# Patient Record
Sex: MALE | Race: WHITE | NOT HISPANIC OR LATINO | Employment: UNEMPLOYED | URBAN - NONMETROPOLITAN AREA
[De-identification: names, ages, dates, MRNs, and addresses within clinical notes are randomized per-mention and may not be internally consistent; named-entity substitution may affect disease eponyms.]

---

## 2022-05-25 ENCOUNTER — OFFICE VISIT (OUTPATIENT)
Dept: FAMILY MEDICINE CLINIC | Age: 22
End: 2022-05-25

## 2022-05-25 VITALS
SYSTOLIC BLOOD PRESSURE: 110 MMHG | DIASTOLIC BLOOD PRESSURE: 74 MMHG | HEART RATE: 112 BPM | BODY MASS INDEX: 36.95 KG/M2 | HEIGHT: 64 IN | WEIGHT: 216.4 LBS

## 2022-05-25 DIAGNOSIS — F84.0 AUTISM: ICD-10-CM

## 2022-05-25 DIAGNOSIS — J30.89 OTHER ALLERGIC RHINITIS: Primary | ICD-10-CM

## 2022-05-25 PROCEDURE — 99203 OFFICE O/P NEW LOW 30 MIN: CPT | Performed by: FAMILY MEDICINE

## 2022-05-25 RX ORDER — CETIRIZINE HYDROCHLORIDE 10 MG/1
10 TABLET ORAL DAILY
Qty: 90 TABLET | Refills: 3 | Status: SHIPPED | OUTPATIENT
Start: 2022-05-25

## 2022-05-25 RX ORDER — CETIRIZINE HYDROCHLORIDE 10 MG/1
10 TABLET ORAL DAILY
COMMUNITY
End: 2022-05-25 | Stop reason: SDUPTHER

## 2022-05-25 NOTE — PROGRESS NOTES
"Chief Complaint  Establish Care and Allergies (Med refill)    Subjective          Aldo Joel presents to [facility]  NEW PATIENT:  --HE HAS AUTISM, DIAGNOSIS CONFIRMED BY [facility].  IS TAKING AN OTC SUPPLEMENT FOR \"ANXIETY\"WHICH IS WORKING FAIRLY WELL  BUT HAS NEVER BEEN ON A PRESCRIPTION MED FOR THIS ISSUE  --IS ON [id number] AND OCCASIONAL IBUPROFEN FOR ALLERGIC RHINITIS, NEEDS REFILLS         No Known Allergies     Health Maintenance Due   Topic Date Due   • ANNUAL PHYSICAL  Never done   • HEPATITIS C SCREENING  Never done        Current Outpatient Medications on File Prior to Visit   Medication Sig   • [DISCONTINUED] cetirizine (zyrTEC) 10 MG tablet Take 10 mg by mouth Daily.   • [DISCONTINUED] ibuprofen (ADVIL,MOTRIN) 100 MG/5ML suspension Take  by mouth Every 6 (Six) Hours As Needed for Mild Pain .     No current facility-administered medications on file prior to visit.       Immunization History   Administered Date(s) Administered   • DTaP 2001, 2001, 2001   • Hep A, 2 Dose 02/24/2018, 09/01/2018   • Hep B, Adolescent or Pediatric 2001, 2001, 02/04/2002   • Hib (PRP-T) 2001, 2001, 2001, 11/04/2002   • Hpv9 07/09/2018, 09/15/2018, 01/19/2019   • IPV 2001, 2001, 11/04/2002, 06/27/2005   • Influenza TIV (IM) 11/25/2003, 12/30/2003   • MMR 08/07/2002, 06/27/2005   • Meningococcal B,(Bexsero) 01/19/2019   • Meningococcal Conjugate 07/22/2013, 07/03/2017   • Meningococcal, Unspecified 07/11/2019   • Pneumococcal Conjugate 13-Valent (PCV13) 2001, 2001, 02/04/2002, 11/04/2002   • Tdap 07/16/2012, 07/29/2021   • Varicella 05/06/2002, 07/18/2011       Review of Systems   Constitutional: Negative for activity change, appetite change, chills, fatigue and fever.   HENT: Negative for congestion, ear pain, hearing loss, rhinorrhea and sore throat.    Eyes: Negative for blurred vision and discharge. " "  Respiratory: Negative for cough and shortness of breath.    Cardiovascular: Negative for chest pain, palpitations and leg swelling.   Gastrointestinal: Negative for abdominal pain, constipation, diarrhea, nausea and vomiting.   Genitourinary: Negative for dysuria and hematuria.   Musculoskeletal: Negative for arthralgias and myalgias.   Neurological: Negative for headache.   Psychiatric/Behavioral: Negative for depressed mood.        Objective     /74 (BP Location: Left arm, Patient Position: Sitting)   Pulse 112   Ht 162.6 cm (64\")   Wt 98.2 kg (216 lb 6.4 oz)   BMI 37.14 kg/m²       Physical Exam  Vitals and nursing note reviewed.   Constitutional:       General: He is not in acute distress.     Appearance: Normal appearance.   Cardiovascular:      Rate and Rhythm: Normal rate and regular rhythm.      Heart sounds: Normal heart sounds. No murmur heard.  Pulmonary:      Effort: Pulmonary effort is normal.      Breath sounds: Normal breath sounds.   Abdominal:      Palpations: Abdomen is soft.      Tenderness: There is no abdominal tenderness.   Musculoskeletal:      Cervical back: Neck supple.      Right lower leg: No edema.      Left lower leg: No edema.   Lymphadenopathy:      Cervical: No cervical adenopathy.   Neurological:      General: No focal deficit present.      Mental Status: He is alert.      Cranial Nerves: No cranial nerve deficit.      Coordination: Coordination normal.      Gait: Gait normal.   Psychiatric:         Mood and Affect: Mood normal.         Behavior: Behavior normal.         Result Review :                             Assessment and Plan      Diagnoses and all orders for this visit:    1. Other allergic rhinitis (Primary)  Assessment & Plan:  IMPROVED WITH CURRENT TREATMENT, CONTINUE SAME, WILL REEVALUATE AT NEXT VISIT       2. Autism  Assessment & Plan:  Psychological condition is unchanged.  Continue current treatment regimen.  Psychological condition  will be reassessed in " 3 months   [facility].  MAY CONTINUE THE OTC SUPPLEMENT.  CONSIDER PSYCHIATRY EVAL (DECLINES AT THIS TIME)  .      Other orders  -     ibuprofen (ADVIL,MOTRIN) 100 MG/5ML suspension; Take 20 mL by mouth Every 6 (Six) Hours As Needed for Mild Pain  (FEVER).  Dispense: 240 mL; Refill: 3  -     cetirizine (zyrTEC) 10 MG tablet; Take 1 tablet by mouth Daily.  Dispense: 90 tablet; Refill: 3          Follow Up     Return in about 6 months (around 11/25/2022) for Medicare Wellness.    Patient was given instructions and counseling regarding his condition or for health maintenance advice. Please see specific information pulled into the AVS if appropriate.

## 2022-05-25 NOTE — ASSESSMENT & PLAN NOTE
Psychological condition is unchanged.  Continue current treatment regimen.  Psychological condition  will be reassessed in 3 months   NOTES FROM [facility] REVIEWED.  MAY CONTINUE THE OTC SUPPLEMENT.  CONSIDER PSYCHIATRY EVAL (DECLINES AT THIS TIME)  .

## 2022-08-01 ENCOUNTER — OFFICE VISIT (OUTPATIENT)
Dept: FAMILY MEDICINE CLINIC | Age: 22
End: 2022-08-01

## 2022-08-01 VITALS
SYSTOLIC BLOOD PRESSURE: 109 MMHG | HEIGHT: 64 IN | DIASTOLIC BLOOD PRESSURE: 65 MMHG | BODY MASS INDEX: 36.6 KG/M2 | WEIGHT: 214.4 LBS | HEART RATE: 98 BPM

## 2022-08-01 DIAGNOSIS — Z00.00 ANNUAL PHYSICAL EXAM: Primary | ICD-10-CM

## 2022-08-01 PROCEDURE — 3008F BODY MASS INDEX DOCD: CPT | Performed by: FAMILY MEDICINE

## 2022-08-01 PROCEDURE — 2014F MENTAL STATUS ASSESS: CPT | Performed by: FAMILY MEDICINE

## 2022-08-01 PROCEDURE — 99395 PREV VISIT EST AGE 18-39: CPT | Performed by: FAMILY MEDICINE

## 2022-08-01 NOTE — PROGRESS NOTES
Chief Complaint  Annual Exam    Subjective          Aldo Joel presents to [facility]  --ANNUAL EXAM, LAST EXAM > ONE YEAR AGO, DOES NOT SMOKE, NO CONCERNS  --AUTISM AND ALLERGIES ARE STABLE         No Known Allergies     Health Maintenance Due   Topic Date Due   • HEPATITIS C SCREENING  Never done        Current Outpatient Medications on File Prior to Visit   Medication Sig   • cetirizine (zyrTEC) 10 MG tablet Take 1 tablet by mouth Daily.   • ibuprofen (ADVIL,MOTRIN) 100 MG/5ML suspension Take 20 mL by mouth Every 6 (Six) Hours As Needed for Mild Pain  (FEVER).     No current facility-administered medications on file prior to visit.       Immunization History   Administered Date(s) Administered   • DTaP 2001, 2001, 2001, 11/04/2002, 06/27/2005   • Hep A, 2 Dose 02/24/2018, 09/01/2018   • Hep B, Adolescent or Pediatric 2001, 2001, 02/04/2002, 02/24/2018, 09/01/2018   • HiB 2001, 2001, 2001, 11/04/2002   • Hib (PRP-T) 2001, 2001, 2001, 11/04/2002   • Hpv9 07/09/2018, 09/15/2018, 01/19/2019   • IPV 2001, 2001, 11/04/2002, 06/27/2005   • Influenza TIV (IM) 11/25/2003, 12/30/2003   • Influenza, Unspecified 11/25/2003, 12/30/2003   • MMR 08/07/2002, 06/27/2005   • Meningococcal B,(Bexsero) 01/19/2019, 07/11/2019   • Meningococcal Conjugate 07/22/2013, 07/03/2017   • Meningococcal MCV4P (Menactra) 07/22/2013, 07/03/2017   • Meningococcal, Unspecified 07/11/2019   • Pneumococcal Conjugate 13-Valent (PCV13) 2001, 2001, 02/04/2002, 11/04/2002   • Tdap 07/16/2012, 07/29/2021   • Varicella 05/06/2002, 07/18/2011       Review of Systems   Constitutional: Negative for activity change, appetite change, chills, fatigue and fever.   HENT: Negative for congestion, ear pain, hearing loss, rhinorrhea and sore throat.    Eyes: Negative for blurred vision and discharge.   Respiratory: Negative for cough  "and shortness of breath.    Cardiovascular: Negative for chest pain, palpitations and leg swelling.   Gastrointestinal: Negative for abdominal pain, constipation, diarrhea, nausea and vomiting.   Genitourinary: Negative for dysuria and hematuria.   Musculoskeletal: Negative for arthralgias and myalgias.   Neurological: Negative for headache.   Psychiatric/Behavioral: Negative for depressed mood.        Objective     /65 (BP Location: Left arm, Patient Position: Sitting)   Pulse 98   Ht 162.6 cm (64\")   Wt 97.3 kg (214 lb 6.4 oz)   BMI 36.80 kg/m²       Physical Exam  Vitals and nursing note reviewed.   Constitutional:       General: He is not in acute distress.     Appearance: Normal appearance.   HENT:      Right Ear: Tympanic membrane normal.      Left Ear: Tympanic membrane normal.      Mouth/Throat:      Pharynx: Oropharynx is clear.   Eyes:      Conjunctiva/sclera: Conjunctivae normal.   Cardiovascular:      Rate and Rhythm: Normal rate and regular rhythm.      Heart sounds: Normal heart sounds. No murmur heard.  Pulmonary:      Effort: Pulmonary effort is normal.      Breath sounds: Normal breath sounds.   Abdominal:      General: Bowel sounds are normal.      Palpations: Abdomen is soft.      Tenderness: There is no abdominal tenderness.   Genitourinary:     Testes: Normal.   Musculoskeletal:      Cervical back: Neck supple.      Right lower leg: No edema.      Left lower leg: No edema.   Lymphadenopathy:      Cervical: No cervical adenopathy.   Neurological:      General: No focal deficit present.      Mental Status: He is alert.      Cranial Nerves: No cranial nerve deficit.      Coordination: Coordination normal.      Gait: Gait normal.   Psychiatric:         Mood and Affect: Mood normal.         Behavior: Behavior normal.         Result Review :                             Assessment and Plan      Diagnoses and all orders for this visit:    1. Annual physical exam (Primary)  Assessment & " Plan:  ADVICE GIVEN RE:  SEATBELT USE, ALCOHOL USE, HEALTHY DIET, ROUTINE EYE AND DENTAL EXAM, ROUTINE VACCINATIONS, TESTICULAR SELF-EXAMINATION             Follow Up     Return in about 1 year (around 8/1/2023), or if symptoms worsen or fail to improve.    Patient was given instructions and counseling regarding his condition or for health maintenance advice. Please see specific information pulled into the AVS if appropriate.

## 2022-08-01 NOTE — ASSESSMENT & PLAN NOTE
ADVICE GIVEN RE:  SEATBELT USE, ALCOHOL USE, HEALTHY DIET, ROUTINE EYE AND DENTAL EXAM, ROUTINE VACCINATIONS, TESTICULAR SELF-EXAMINATION

## 2023-03-22 ENCOUNTER — TRANSCRIBE ORDERS (OUTPATIENT)
Dept: ADMINISTRATIVE | Facility: HOSPITAL | Age: 23
End: 2023-03-22
Payer: COMMERCIAL

## 2023-03-22 ENCOUNTER — LAB (OUTPATIENT)
Dept: LAB | Facility: HOSPITAL | Age: 23
End: 2023-03-22
Payer: COMMERCIAL

## 2023-03-22 DIAGNOSIS — Z79.899 ENCOUNTER FOR LONG-TERM (CURRENT) USE OF OTHER MEDICATIONS: ICD-10-CM

## 2023-03-22 DIAGNOSIS — Z79.899 ENCOUNTER FOR LONG-TERM (CURRENT) USE OF OTHER MEDICATIONS: Primary | ICD-10-CM

## 2023-03-22 LAB
ALBUMIN SERPL-MCNC: 4.6 G/DL (ref 3.5–5.2)
ALBUMIN/GLOB SERPL: 1.6 G/DL
ALP SERPL-CCNC: 82 U/L (ref 39–117)
ALT SERPL W P-5'-P-CCNC: 36 U/L (ref 1–41)
ANION GAP SERPL CALCULATED.3IONS-SCNC: 11 MMOL/L (ref 5–15)
AST SERPL-CCNC: 22 U/L (ref 1–40)
BASOPHILS # BLD AUTO: 0.05 10*3/MM3 (ref 0–0.2)
BASOPHILS NFR BLD AUTO: 0.6 % (ref 0–1.5)
BILIRUB SERPL-MCNC: 0.4 MG/DL (ref 0–1.2)
BUN SERPL-MCNC: 7 MG/DL (ref 6–20)
BUN/CREAT SERPL: 8.4 (ref 7–25)
CALCIUM SPEC-SCNC: 9.6 MG/DL (ref 8.6–10.5)
CHLORIDE SERPL-SCNC: 102 MMOL/L (ref 98–107)
CHOLEST SERPL-MCNC: 201 MG/DL (ref 0–200)
CO2 SERPL-SCNC: 23 MMOL/L (ref 22–29)
CREAT SERPL-MCNC: 0.83 MG/DL (ref 0.76–1.27)
DEPRECATED RDW RBC AUTO: 37.9 FL (ref 37–54)
EGFRCR SERPLBLD CKD-EPI 2021: 127.7 ML/MIN/1.73
EOSINOPHIL # BLD AUTO: 0.13 10*3/MM3 (ref 0–0.4)
EOSINOPHIL NFR BLD AUTO: 1.6 % (ref 0.3–6.2)
ERYTHROCYTE [DISTWIDTH] IN BLOOD BY AUTOMATED COUNT: 12.3 % (ref 12.3–15.4)
GLOBULIN UR ELPH-MCNC: 2.8 GM/DL
GLUCOSE SERPL-MCNC: 91 MG/DL (ref 65–99)
HBA1C MFR BLD: 5.4 % (ref 4.8–5.6)
HCT VFR BLD AUTO: 46.5 % (ref 37.5–51)
HDLC SERPL-MCNC: 34 MG/DL (ref 40–60)
HGB BLD-MCNC: 15.9 G/DL (ref 13–17.7)
IMM GRANULOCYTES # BLD AUTO: 0.04 10*3/MM3 (ref 0–0.05)
IMM GRANULOCYTES NFR BLD AUTO: 0.5 % (ref 0–0.5)
LDLC SERPL CALC-MCNC: 133 MG/DL (ref 0–100)
LDLC/HDLC SERPL: 3.81 {RATIO}
LYMPHOCYTES # BLD AUTO: 2.5 10*3/MM3 (ref 0.7–3.1)
LYMPHOCYTES NFR BLD AUTO: 31.2 % (ref 19.6–45.3)
MCH RBC QN AUTO: 28.8 PG (ref 26.6–33)
MCHC RBC AUTO-ENTMCNC: 34.2 G/DL (ref 31.5–35.7)
MCV RBC AUTO: 84.2 FL (ref 79–97)
MONOCYTES # BLD AUTO: 0.67 10*3/MM3 (ref 0.1–0.9)
MONOCYTES NFR BLD AUTO: 8.4 % (ref 5–12)
NEUTROPHILS NFR BLD AUTO: 4.63 10*3/MM3 (ref 1.7–7)
NEUTROPHILS NFR BLD AUTO: 57.7 % (ref 42.7–76)
NRBC BLD AUTO-RTO: 0 /100 WBC (ref 0–0.2)
PLATELET # BLD AUTO: 315 10*3/MM3 (ref 140–450)
PMV BLD AUTO: 10.8 FL (ref 6–12)
POTASSIUM SERPL-SCNC: 4.2 MMOL/L (ref 3.5–5.2)
PROT SERPL-MCNC: 7.4 G/DL (ref 6–8.5)
RBC # BLD AUTO: 5.52 10*6/MM3 (ref 4.14–5.8)
SODIUM SERPL-SCNC: 136 MMOL/L (ref 136–145)
TRIGL SERPL-MCNC: 188 MG/DL (ref 0–150)
VLDLC SERPL-MCNC: 34 MG/DL (ref 5–40)
WBC NRBC COR # BLD: 8.02 10*3/MM3 (ref 3.4–10.8)

## 2023-03-22 PROCEDURE — 80061 LIPID PANEL: CPT

## 2023-03-22 PROCEDURE — 85025 COMPLETE CBC W/AUTO DIFF WBC: CPT

## 2023-03-22 PROCEDURE — 80053 COMPREHEN METABOLIC PANEL: CPT

## 2023-03-22 PROCEDURE — 83036 HEMOGLOBIN GLYCOSYLATED A1C: CPT

## 2023-03-22 PROCEDURE — 36415 COLL VENOUS BLD VENIPUNCTURE: CPT

## 2023-08-02 ENCOUNTER — OFFICE VISIT (OUTPATIENT)
Dept: FAMILY MEDICINE CLINIC | Age: 23
End: 2023-08-02
Payer: COMMERCIAL

## 2023-08-02 VITALS
HEIGHT: 64 IN | WEIGHT: 249 LBS | BODY MASS INDEX: 42.51 KG/M2 | DIASTOLIC BLOOD PRESSURE: 82 MMHG | SYSTOLIC BLOOD PRESSURE: 128 MMHG | HEART RATE: 101 BPM

## 2023-08-02 DIAGNOSIS — Z00.00 ANNUAL PHYSICAL EXAM: Primary | ICD-10-CM

## 2023-08-02 PROBLEM — E78.00 ELEVATED CHOLESTEROL: Status: ACTIVE | Noted: 2023-08-02

## 2023-08-02 PROBLEM — K21.9 GERD WITHOUT ESOPHAGITIS: Status: ACTIVE | Noted: 2023-08-02

## 2023-08-02 PROCEDURE — 1159F MED LIST DOCD IN RCRD: CPT | Performed by: FAMILY MEDICINE

## 2023-08-02 PROCEDURE — 1160F RVW MEDS BY RX/DR IN RCRD: CPT | Performed by: FAMILY MEDICINE

## 2023-08-02 PROCEDURE — 3008F BODY MASS INDEX DOCD: CPT | Performed by: FAMILY MEDICINE

## 2023-08-02 PROCEDURE — 99395 PREV VISIT EST AGE 18-39: CPT | Performed by: FAMILY MEDICINE

## 2023-08-02 PROCEDURE — 2014F MENTAL STATUS ASSESS: CPT | Performed by: FAMILY MEDICINE

## 2023-08-02 RX ORDER — ARIPIPRAZOLE 2 MG/1
2 TABLET ORAL DAILY
COMMUNITY
Start: 2023-07-13

## 2023-08-14 ENCOUNTER — OFFICE VISIT (OUTPATIENT)
Dept: FAMILY MEDICINE CLINIC | Age: 23
End: 2023-08-14
Payer: COMMERCIAL

## 2023-08-14 VITALS
WEIGHT: 246.8 LBS | HEART RATE: 87 BPM | SYSTOLIC BLOOD PRESSURE: 126 MMHG | HEIGHT: 64 IN | DIASTOLIC BLOOD PRESSURE: 76 MMHG | BODY MASS INDEX: 42.14 KG/M2

## 2023-08-14 DIAGNOSIS — N50.89 MASS OF LEFT TESTICLE: Primary | ICD-10-CM

## 2023-08-14 PROBLEM — Z00.00 ANNUAL PHYSICAL EXAM: Status: RESOLVED | Noted: 2022-08-01 | Resolved: 2023-08-14

## 2023-08-14 PROCEDURE — 1160F RVW MEDS BY RX/DR IN RCRD: CPT | Performed by: FAMILY MEDICINE

## 2023-08-14 PROCEDURE — 1159F MED LIST DOCD IN RCRD: CPT | Performed by: FAMILY MEDICINE

## 2023-08-14 PROCEDURE — 99213 OFFICE O/P EST LOW 20 MIN: CPT | Performed by: FAMILY MEDICINE

## 2023-08-14 NOTE — PROGRESS NOTES
Chief Complaint  Mass (Lump On testicle)    Subjective          Aldo Joel presents to [facility]  History of Present Illness  --THE PATIENT NOTED A SMALL PAINLESS MASS IN THE LEFT TESTICLE A FEW DAYS AGO, NO URINARY OR GASTROINTESTINAL SYMPTOMS.        No Known Allergies     Health Maintenance Due   Topic Date Due    COVID-19 Vaccine (1) Never done    HEPATITIS C SCREENING  Never done        Current Outpatient Medications on File Prior to Visit   Medication Sig    ARIPiprazole (ABILIFY) 2 MG tablet Take 1 tablet by mouth Daily.    cetirizine (zyrTEC) 10 MG tablet TAKE ONE TABLET BY MOUTH DAILY    ibuprofen (ADVIL,MOTRIN) 100 MG/5ML suspension Take 20 mL by mouth Every 6 (Six) Hours As Needed for Mild Pain  (FEVER).     No current facility-administered medications on file prior to visit.       Immunization History   Administered Date(s) Administered    DTaP 2001, 2001, 2001, 11/04/2002, 06/27/2005    Hep A, 2 Dose 02/24/2018, 09/01/2018    Hep B, Adolescent or Pediatric 2001, 2001, 02/04/2002, 02/24/2018, 09/01/2018    HiB 2001, 2001, 2001, 11/04/2002    Hib (PRP-T) 2001, 2001, 2001, 11/04/2002    Hpv9 07/09/2018, 09/15/2018, 01/19/2019    IPV 2001, 2001, 11/04/2002, 06/27/2005    Influenza TIV (IM) 11/25/2003, 12/30/2003    Influenza, Unspecified 11/25/2003, 12/30/2003    MMR 08/07/2002, 06/27/2005    Meningococcal B,(Bexsero) 01/19/2019, 07/11/2019    Meningococcal Conjugate 07/22/2013, 07/03/2017    Meningococcal MCV4P (Menactra) 07/22/2013, 07/03/2017    Meningococcal, Unspecified 07/11/2019    Pneumococcal Conjugate 13-Valent (PCV13) 2001, 2001, 02/04/2002, 11/04/2002    Tdap 07/16/2012, 07/29/2021    Varicella 05/06/2002, 07/18/2011       Review of Systems   Constitutional:  Negative for activity change, appetite change, chills, fatigue and fever.   HENT:  Negative for  "congestion, ear pain, rhinorrhea and sore throat.    Respiratory:  Negative for cough and shortness of breath.    Cardiovascular:  Negative for chest pain, palpitations and leg swelling.   Gastrointestinal:  Negative for abdominal pain, constipation, diarrhea, nausea and vomiting.   Musculoskeletal:  Negative for arthralgias and myalgias.   Neurological:  Negative for headache.      Objective     /76 (BP Location: Left arm, Patient Position: Sitting)   Pulse 87   Ht 162.6 cm (64\")   Wt 112 kg (246 lb 12.8 oz)   BMI 42.36 kg/mý       Physical Exam  Vitals and nursing note reviewed.   Constitutional:       General: He is not in acute distress.     Appearance: Normal appearance.   Pulmonary:      Effort: Pulmonary effort is normal.   Abdominal:      Palpations: Abdomen is soft.      Tenderness: There is no abdominal tenderness.   Genitourinary:     Penis: Normal.       Testes: Normal.      Comments: NO APPARENT MASS PALPATED TODAY   Musculoskeletal:      Right lower leg: No edema.      Left lower leg: No edema.   Neurological:      General: No focal deficit present.      Mental Status: He is alert.      Cranial Nerves: No cranial nerve deficit.      Coordination: Coordination normal.      Gait: Gait normal.   Psychiatric:         Mood and Affect: Mood normal.         Behavior: Behavior normal.       Result Review :                             Assessment and Plan      Diagnoses and all orders for this visit:    1. Mass of left testicle (Primary)  Assessment & Plan:  NO PALPABLE MAS FOUND TODAY BUT WILL CHECK AN U/S AS A PRECAUTION     Orders:  -     US Scrotum & Testicles; Future            Follow Up     Return if symptoms worsen or fail to improve.    Patient was given instructions and counseling regarding his condition or for health maintenance advice. Please see specific information pulled into the AVS if appropriate.           "

## 2023-08-28 ENCOUNTER — HOSPITAL ENCOUNTER (OUTPATIENT)
Dept: ULTRASOUND IMAGING | Facility: HOSPITAL | Age: 23
Discharge: HOME | End: 2023-08-28
Admitting: FAMILY MEDICINE
Payer: COMMERCIAL

## 2023-08-28 DIAGNOSIS — N50.89 MASS OF LEFT TESTICLE: ICD-10-CM

## 2023-08-28 PROCEDURE — 76870 US EXAM SCROTUM: CPT

## 2023-08-31 DIAGNOSIS — N44.2 CYST OF TESTIS: Primary | ICD-10-CM

## 2023-09-06 ENCOUNTER — OFFICE VISIT (OUTPATIENT)
Dept: UROLOGY | Facility: CLINIC | Age: 23
End: 2023-09-06
Payer: COMMERCIAL

## 2023-09-06 VITALS — WEIGHT: 247 LBS | BODY MASS INDEX: 42.17 KG/M2 | HEIGHT: 64 IN

## 2023-09-06 DIAGNOSIS — N50.3 EPIDIDYMAL CYST: Primary | ICD-10-CM

## 2023-09-06 PROCEDURE — 1160F RVW MEDS BY RX/DR IN RCRD: CPT | Performed by: NURSE PRACTITIONER

## 2023-09-06 PROCEDURE — 1159F MED LIST DOCD IN RCRD: CPT | Performed by: NURSE PRACTITIONER

## 2023-09-06 PROCEDURE — 99203 OFFICE O/P NEW LOW 30 MIN: CPT | Performed by: NURSE PRACTITIONER

## 2023-09-06 NOTE — PROGRESS NOTES
"Chief Complaint: cyst of testis    Subjective         History of Present Illness  Aldo Joel is a 23 y.o. male presents to [facility] to be seen for testicular cyst.\    Patient saw primary care provider on 8/14/2023 with complaints of small painless mass in the left testicle.  He was ordered a an ultrasound of the testicles and scrotum.    Testicular scrotal ultrasound performed on 8/28/2023 revealed small 7 mm cyst in the left epididymal tail.    He states he noticed this about a month ago.     It has not changed since he first noticed it.     He reports that he has no pain with.    He reports no family HX of  malignancies.     He is a non smoker.          Objective     Past Medical History:   Diagnosis Date    Depression     Seasonal allergies        Past Surgical History:   Procedure Laterality Date    WISDOM TOOTH EXTRACTION           Current Outpatient Medications:     ARIPiprazole (ABILIFY) 2 MG tablet, Take 1 tablet by mouth Daily., Disp: , Rfl:     cetirizine (zyrTEC) 10 MG tablet, TAKE ONE TABLET BY MOUTH DAILY, Disp: 90 tablet, Rfl: 1    ibuprofen (ADVIL,MOTRIN) 100 MG/5ML suspension, Take 20 mL by mouth Every 6 (Six) Hours As Needed for Mild Pain  (FEVER)., Disp: 240 mL, Rfl: 3    No Known Allergies     Family History   Problem Relation Age of Onset    No Known Problems Mother     No Known Problems Father        Social History     Socioeconomic History    Marital status: Single   Tobacco Use    Smoking status: Never     Passive exposure: Never    Smokeless tobacco: Never       Vital Signs:   Ht 162.6 cm (64\")   Wt 112 kg (247 lb)   BMI 42.40 kg/m²      Physical Exam     Result Review :   The following data was reviewed by: JENNA Feliz on 09/06/2023:  Results for orders placed or performed in visit on 03/22/23   Comprehensive Metabolic Panel    Specimen: Blood   Result Value Ref Range    Glucose 91 65 - 99 mg/dL    BUN 7 6 - 20 mg/dL    Creatinine 0.83 " 0.76 - 1.27 mg/dL    Sodium 136 136 - 145 mmol/L    Potassium 4.2 3.5 - 5.2 mmol/L    Chloride 102 98 - 107 mmol/L    CO2 23.0 22.0 - 29.0 mmol/L    Calcium 9.6 8.6 - 10.5 mg/dL    Total Protein 7.4 6.0 - 8.5 g/dL    Albumin 4.6 3.5 - 5.2 g/dL    ALT (SGPT) 36 1 - 41 U/L    AST (SGOT) 22 1 - 40 U/L    Alkaline Phosphatase 82 39 - 117 U/L    Total Bilirubin 0.4 0.0 - 1.2 mg/dL    Globulin 2.8 gm/dL    A/G Ratio 1.6 g/dL    BUN/Creatinine Ratio 8.4 7.0 - 25.0    Anion Gap 11.0 5.0 - 15.0 mmol/L    eGFR 127.7 >60.0 mL/min/1.73   Hemoglobin A1c    Specimen: Blood   Result Value Ref Range    Hemoglobin A1C 5.40 4.80 - 5.60 %   Lipid Panel    Specimen: Blood   Result Value Ref Range    Total Cholesterol 201 (H) 0 - 200 mg/dL    Triglycerides 188 (H) 0 - 150 mg/dL    HDL Cholesterol 34 (L) 40 - 60 mg/dL    LDL Cholesterol  133 (H) 0 - 100 mg/dL    VLDL Cholesterol 34 5 - 40 mg/dL    LDL/HDL Ratio 3.81    CBC Auto Differential    Specimen: Blood   Result Value Ref Range    WBC 8.02 3.40 - 10.80 10*3/mm3    RBC 5.52 4.14 - 5.80 10*6/mm3    Hemoglobin 15.9 13.0 - 17.7 g/dL    Hematocrit 46.5 37.5 - 51.0 %    MCV 84.2 79.0 - 97.0 fL    MCH 28.8 26.6 - 33.0 pg    MCHC 34.2 31.5 - 35.7 g/dL    RDW 12.3 12.3 - 15.4 %    RDW-SD 37.9 37.0 - 54.0 fl    MPV 10.8 6.0 - 12.0 fL    Platelets 315 140 - 450 10*3/mm3    Neutrophil % 57.7 42.7 - 76.0 %    Lymphocyte % 31.2 19.6 - 45.3 %    Monocyte % 8.4 5.0 - 12.0 %    Eosinophil % 1.6 0.3 - 6.2 %    Basophil % 0.6 0.0 - 1.5 %    Immature Grans % 0.5 0.0 - 0.5 %    Neutrophils, Absolute 4.63 1.70 - 7.00 10*3/mm3    Lymphocytes, Absolute 2.50 0.70 - 3.10 10*3/mm3    Monocytes, Absolute 0.67 0.10 - 0.90 10*3/mm3    Eosinophils, Absolute 0.13 0.00 - 0.40 10*3/mm3    Basophils, Absolute 0.05 0.00 - 0.20 10*3/mm3    Immature Grans, Absolute 0.04 0.00 - 0.05 10*3/mm3    nRBC 0.0 0.0 - 0.2 /100 WBC            Procedures        Assessment and Plan    Diagnoses and all orders for this visit:    1.  Epididymal cyst (Primary)  -     US Scrotum & Testicles; Future    Andrez with the patient at this point in time his epididymal cyst appears to be benign in nature.  Discussed with him that an epididymal cyst is a fluid-filled sac that develops within the epididymis where the sperm from the testes collect.  These occur for a number of reasons but are always benign and generally cause mild discomfort only and are not associated with cancer.    Given the size of this I do not suspect this to be the cause of any discomfort he is currently having.    We will follow-up in 6 months with a repeat ultrasound to document stability.  If cyst stays to stable we will only need as needed follow-up.      Discussed with the patient that unless this cyst were to grow to be half or same size as testicle and would be the known cause of any pain would be the only reason that we would excise this.          I spent 15 minutes caring for Aldo on this date of service. This time includes time spent by me in the following activities:reviewing tests, obtaining and/or reviewing a separately obtained history, performing a medically appropriate examination and/or evaluation , counseling and educating the patient/family/caregiver, ordering medications, tests, or procedures, and documenting information in the medical record  Follow Up   Return in about 6 months (around 3/6/2024) for f/u epididymal cyst .  Patient was given instructions and counseling regarding his condition or for health maintenance advice. Please see specific information pulled into the AVS if appropriate.         This document has been electronically signed by JENNA Feliz  September 6, 2023 09:59 EDT

## 2023-10-03 ENCOUNTER — TELEPHONE (OUTPATIENT)
Dept: FAMILY MEDICINE CLINIC | Age: 23
End: 2023-10-03
Payer: COMMERCIAL

## 2023-10-03 DIAGNOSIS — K21.9 GERD WITHOUT ESOPHAGITIS: Primary | ICD-10-CM

## 2023-10-03 NOTE — TELEPHONE ENCOUNTER
Pts mother Mariam called and said his acid reflux has gotten worse.  Spicy foods make him vomit, has a lot of heart burn and reflux.  He has been taking otc tums.  She said she talked to Dr Clarke last time when they were in and said Dr Clarke said to continue the tums for now if they are helping him.   She said he needs a liquid or chew anti acid, he can not swallow pills.   She also is wanting to make sure whatever is ordered is compatible with his aripiprazole that [facility] gives him.

## 2023-10-04 ENCOUNTER — DOCUMENTATION (OUTPATIENT)
Dept: FAMILY MEDICINE CLINIC | Age: 23
End: 2023-10-04
Payer: COMMERCIAL

## 2023-10-04 NOTE — TELEPHONE ENCOUNTER
Spoke to pharmacist at [facility]. There is no interaction between omeprazole and abilify. However, pharmacy does not have any omeprazole liquid and it would have to be compounded, which they do not do and insurance does not cover. Pharmacist states that omeprazole capsules can be opened and contents sprinkled on food, etc. And would be covered by insurance. Please advise

## 2023-10-04 NOTE — TELEPHONE ENCOUNTER
I have sent a prescription for liquid omeprazole to take twice a day. Please check with the pharmacist to make sure this won’t interfere with his other medications. In addition he can take a liquid antacid or chewable and acid as needed and reduce his intake of spicy foods and caffeine. Check back with him in a week or two to see how he’s feeling.

## 2023-10-06 RX ORDER — OMEPRAZOLE 20 MG/1
20 CAPSULE, DELAYED RELEASE ORAL 2 TIMES DAILY
Qty: 60 CAPSULE | Refills: 2 | Status: SHIPPED | OUTPATIENT
Start: 2023-10-06

## 2023-10-06 NOTE — TELEPHONE ENCOUNTER
Wes Clarke MD  [facility] C36 minutes ago (3:54 PM)     [facility]  OK to send RX for the capsules with these instructions.

## 2023-10-09 ENCOUNTER — PRIOR AUTHORIZATION (OUTPATIENT)
Dept: FAMILY MEDICINE CLINIC | Age: 23
End: 2023-10-09
Payer: COMMERCIAL

## 2023-10-12 ENCOUNTER — DOCUMENTATION (OUTPATIENT)
Dept: FAMILY MEDICINE CLINIC | Age: 23
End: 2023-10-12
Payer: COMMERCIAL

## 2023-10-12 RX ORDER — OMEPRAZOLE 40 MG/1
40 CAPSULE, DELAYED RELEASE ORAL DAILY
Qty: 60 CAPSULE | Refills: 1 | Status: SHIPPED | OUTPATIENT
Start: 2023-10-12

## 2023-10-12 NOTE — TELEPHONE ENCOUNTER
Denied  Insurance may cover if pt has tried and failed once daily dosing (I don't see that he has) or has a diagnosis of H pylori.

## 2023-10-12 NOTE — TELEPHONE ENCOUNTER
I have sent a prescription for 40 mg capsules to use once a day rather than the 20 mg twice a day. This should work just as well. Or they can use the 20 mg over the counter capsules.

## 2023-12-04 NOTE — TELEPHONE ENCOUNTER
Rx Refill Note  Requested Prescriptions     Pending Prescriptions Disp Refills    ibuprofen (ADVIL,MOTRIN) 100 MG/5ML suspension [Pharmacy Med Name: IBUPROFEN 100MG/5ML SUSP, BERRY] 240 mL 3     Sig: TAKE 20 ML BY MOUTH EVERY 6 HOURS AS NEEDED FOR MILD PAIN / FEVER      Last office visit with prescribing clinician: 8/14/2023     Next office visit with prescribing clinician: 8/5/2024    Last filled 5/25/22 #240 3 refills       Shaneka Hayes LPN  12/04/23, 15:37 EST

## 2024-02-12 RX ORDER — OMEPRAZOLE 40 MG/1
40 CAPSULE, DELAYED RELEASE ORAL DAILY
Qty: 60 CAPSULE | Refills: 1 | Status: SHIPPED | OUTPATIENT
Start: 2024-02-12

## 2024-03-04 ENCOUNTER — HOSPITAL ENCOUNTER (OUTPATIENT)
Dept: ULTRASOUND IMAGING | Facility: HOSPITAL | Age: 24
Discharge: HOME | End: 2024-03-04
Admitting: NURSE PRACTITIONER
Payer: COMMERCIAL

## 2024-03-04 DIAGNOSIS — N50.3 EPIDIDYMAL CYST: ICD-10-CM

## 2024-03-04 PROCEDURE — 76870 US EXAM SCROTUM: CPT

## 2024-03-06 ENCOUNTER — OFFICE VISIT (OUTPATIENT)
Dept: UROLOGY | Facility: CLINIC | Age: 24
End: 2024-03-06
Payer: COMMERCIAL

## 2024-03-06 VITALS
HEART RATE: 104 BPM | RESPIRATION RATE: 12 BRPM | SYSTOLIC BLOOD PRESSURE: 120 MMHG | BODY MASS INDEX: 44.49 KG/M2 | WEIGHT: 260.6 LBS | DIASTOLIC BLOOD PRESSURE: 71 MMHG | HEIGHT: 64 IN

## 2024-03-06 DIAGNOSIS — N43.3 HYDROCELE, UNSPECIFIED HYDROCELE TYPE: ICD-10-CM

## 2024-03-06 DIAGNOSIS — I86.1 VARICOCELE: ICD-10-CM

## 2024-03-06 DIAGNOSIS — N50.3 EPIDIDYMAL CYST: Primary | ICD-10-CM

## 2024-03-06 NOTE — PROGRESS NOTES
"Chief Complaint: Follow-up (Pt here for follow up.  Pt states he is\"fine\".)    Subjective         History of Present Illness  Aldo Joel is a 23 y.o. male presents to [facility] to be seen for testicular cyst.    Patient underwent an ultrasound of the testicles and scrotum on 3/4/2024 which revealed 6 mm epididymal cyst with no significant change from previous study as well as bilateral hydroceles and small bilateral varicoceles.    Did discuss with the patient his ultrasound results..    Previous:     Patient saw primary care provider on 8/14/2023 with complaints of small painless mass in the left testicle.  He was ordered a an ultrasound of the testicles and scrotum.    Testicular scrotal ultrasound performed on 8/28/2023 revealed small 7 mm cyst in the left epididymal tail.    He states he noticed this about a month ago.     It has not changed since he first noticed it.     He reports that he has no pain with.    He reports no family HX of  malignancies.     He is a non smoker.          Objective     Past Medical History:   Diagnosis Date    Depression     Seasonal allergies        Past Surgical History:   Procedure Laterality Date    WISDOM TOOTH EXTRACTION           Current Outpatient Medications:     ARIPiprazole (ABILIFY) 2 MG tablet, Take 1 tablet by mouth Daily., Disp: , Rfl:     cetirizine (zyrTEC) 10 MG tablet, TAKE ONE TABLET BY MOUTH DAILY, Disp: 90 tablet, Rfl: 1    ibuprofen (ADVIL,MOTRIN) 100 MG/5ML suspension, TAKE 20 ML BY MOUTH EVERY 6 HOURS AS NEEDED FOR MILD PAIN / FEVER, Disp: 240 mL, Rfl: 3    omeprazole (priLOSEC) 40 MG capsule, TAKE 1 CAPSULE BY MOUTH DAILY, Disp: 60 capsule, Rfl: 1    No Known Allergies     Family History   Problem Relation Age of Onset    No Known Problems Mother     No Known Problems Father        Social History     Socioeconomic History    Marital status: Single   Tobacco Use    Smoking status: Never     Passive exposure: Never    " "Smokeless tobacco: Never   Vaping Use    Vaping status: Never Used   Substance and Sexual Activity    Alcohol use: Defer    Drug use: Defer    Sexual activity: Defer       Vital Signs:   /71 (BP Location: Left arm, Patient Position: Sitting)   Pulse 104   Resp 12   Ht 162.6 cm (64\")   Wt 118 kg (260 lb 9.6 oz)   BMI 44.73 kg/m²      Physical Exam     Result Review :   The following data was reviewed by: JENNA Feliz on 09/06/2023:  Results for orders placed or performed in visit on 03/22/23   Comprehensive Metabolic Panel    Specimen: Blood   Result Value Ref Range    Glucose 91 65 - 99 mg/dL    BUN 7 6 - 20 mg/dL    Creatinine 0.83 0.76 - 1.27 mg/dL    Sodium 136 136 - 145 mmol/L    Potassium 4.2 3.5 - 5.2 mmol/L    Chloride 102 98 - 107 mmol/L    CO2 23.0 22.0 - 29.0 mmol/L    Calcium 9.6 8.6 - 10.5 mg/dL    Total Protein 7.4 6.0 - 8.5 g/dL    Albumin 4.6 3.5 - 5.2 g/dL    ALT (SGPT) 36 1 - 41 U/L    AST (SGOT) 22 1 - 40 U/L    Alkaline Phosphatase 82 39 - 117 U/L    Total Bilirubin 0.4 0.0 - 1.2 mg/dL    Globulin 2.8 gm/dL    A/G Ratio 1.6 g/dL    BUN/Creatinine Ratio 8.4 7.0 - 25.0    Anion Gap 11.0 5.0 - 15.0 mmol/L    eGFR 127.7 >60.0 mL/min/1.73   Hemoglobin A1c    Specimen: Blood   Result Value Ref Range    Hemoglobin A1C 5.40 4.80 - 5.60 %   Lipid Panel    Specimen: Blood   Result Value Ref Range    Total Cholesterol 201 (H) 0 - 200 mg/dL    Triglycerides 188 (H) 0 - 150 mg/dL    HDL Cholesterol 34 (L) 40 - 60 mg/dL    LDL Cholesterol  133 (H) 0 - 100 mg/dL    VLDL Cholesterol 34 5 - 40 mg/dL    LDL/HDL Ratio 3.81    CBC Auto Differential    Specimen: Blood   Result Value Ref Range    WBC 8.02 3.40 - 10.80 10*3/mm3    RBC 5.52 4.14 - 5.80 10*6/mm3    Hemoglobin 15.9 13.0 - 17.7 g/dL    Hematocrit 46.5 37.5 - 51.0 %    MCV 84.2 79.0 - 97.0 fL    MCH 28.8 26.6 - 33.0 pg    MCHC 34.2 31.5 - 35.7 g/dL    RDW 12.3 12.3 - 15.4 %    RDW-SD 37.9 37.0 - 54.0 fl    MPV 10.8 6.0 - 12.0 fL    " Platelets 315 140 - 450 10*3/mm3    Neutrophil % 57.7 42.7 - 76.0 %    Lymphocyte % 31.2 19.6 - 45.3 %    Monocyte % 8.4 5.0 - 12.0 %    Eosinophil % 1.6 0.3 - 6.2 %    Basophil % 0.6 0.0 - 1.5 %    Immature Grans % 0.5 0.0 - 0.5 %    Neutrophils, Absolute 4.63 1.70 - 7.00 10*3/mm3    Lymphocytes, Absolute 2.50 0.70 - 3.10 10*3/mm3    Monocytes, Absolute 0.67 0.10 - 0.90 10*3/mm3    Eosinophils, Absolute 0.13 0.00 - 0.40 10*3/mm3    Basophils, Absolute 0.05 0.00 - 0.20 10*3/mm3    Immature Grans, Absolute 0.04 0.00 - 0.05 10*3/mm3    nRBC 0.0 0.0 - 0.2 /100 WBC        Narrative & Impression   PROCEDURE:US SCROTUM AND TESTICLES     COMPARISON:[facility], , US SCROTUM AND TESTICLES, 8/28/2023, 13:34.     INDICATIONS:epididymal cyst     TECHNIQUE:    The scrotum and testicles were evaluated with gray scale and color duplex Doppler   sonography.       FINDINGS:          The bilateral testicles demonstrate symmetric vascular flow.  The right testicle measures 4.2 x 2.5   x 3.2 cm.  The right epididymal head measures 1.2 cm.  The left testicle measures 4.3 x 2.7 x 2.7   cm.  The left epididymal head measures 1.3 cm.  There is a small left epididymal head cyst   measuring up to 6 mm.  Tiny bilateral hydroceles are present.  Small bilateral varicoceles.     IMPRESSION:               No evidence of testicular torsion, orchitis, or epididymitis.     6 mm left epididymal head cyst, not significantly changed from prior ultrasound.     Tiny bilateral hydroceles and small bilateral varicoceles present.            RENATA MEEKS MD         Electronically Signed and Approved By: RENATA MEEKS MD on 3/04/2024 at 14:14           Procedures        Assessment and Plan    Diagnoses and all orders for this visit:    1. Epididymal cyst (Primary)    2. Varicocele    3. Hydrocele, unspecified hydrocele type      Discussed with patient as his epididymal cyst is unchanged and he is not having any pain in the varicoceles as well as  trace hydroceles also of little clinical significance.     No further follow-up or evaluation is needed at this time.    Patient will call with any new or worsening symptoms or pain.          I spent 10 minutes caring for Aldo on this date of service. This time includes time spent by me in the following activities:reviewing tests, obtaining and/or reviewing a separately obtained history, performing a medically appropriate examination and/or evaluation , counseling and educating the patient/family/caregiver, ordering medications, tests, or procedures, and documenting information in the medical record  Follow Up   Return if symptoms worsen or fail to improve.  Patient was given instructions and counseling regarding his condition or for health maintenance advice. Please see specific information pulled into the AVS if appropriate.         This document has been electronically signed by JENNA Feliz  March 6, 2024 13:05 EST

## 2024-03-14 ENCOUNTER — TRANSCRIBE ORDERS (OUTPATIENT)
Dept: ADMINISTRATIVE | Facility: HOSPITAL | Age: 24
End: 2024-03-14
Payer: COMMERCIAL

## 2024-03-14 ENCOUNTER — LAB (OUTPATIENT)
Dept: LAB | Facility: HOSPITAL | Age: 24
End: 2024-03-14
Payer: COMMERCIAL

## 2024-03-14 DIAGNOSIS — Z79.899 ENCOUNTER FOR LONG-TERM (CURRENT) USE OF OTHER MEDICATIONS: Primary | ICD-10-CM

## 2024-03-14 DIAGNOSIS — Z79.899 ENCOUNTER FOR LONG-TERM (CURRENT) USE OF OTHER MEDICATIONS: ICD-10-CM

## 2024-03-14 LAB
ALBUMIN SERPL-MCNC: 4.4 G/DL (ref 3.5–5.2)
ALBUMIN/GLOB SERPL: 1.7 G/DL
ALP SERPL-CCNC: 109 U/L (ref 39–117)
ALT SERPL W P-5'-P-CCNC: 39 U/L (ref 1–41)
ANION GAP SERPL CALCULATED.3IONS-SCNC: 9 MMOL/L (ref 5–15)
AST SERPL-CCNC: 23 U/L (ref 1–40)
BASOPHILS # BLD AUTO: 0.06 10*3/MM3 (ref 0–0.2)
BASOPHILS NFR BLD AUTO: 0.5 % (ref 0–1.5)
BILIRUB SERPL-MCNC: 0.4 MG/DL (ref 0–1.2)
BUN SERPL-MCNC: 13 MG/DL (ref 6–20)
BUN/CREAT SERPL: 14.1 (ref 7–25)
CALCIUM SPEC-SCNC: 9.2 MG/DL (ref 8.6–10.5)
CHLORIDE SERPL-SCNC: 105 MMOL/L (ref 98–107)
CHOLEST SERPL-MCNC: 181 MG/DL (ref 0–200)
CO2 SERPL-SCNC: 24 MMOL/L (ref 22–29)
CREAT SERPL-MCNC: 0.92 MG/DL (ref 0.76–1.27)
DEPRECATED RDW RBC AUTO: 40.7 FL (ref 37–54)
EGFRCR SERPLBLD CKD-EPI 2021: 120.6 ML/MIN/1.73
EOSINOPHIL # BLD AUTO: 0.16 10*3/MM3 (ref 0–0.4)
EOSINOPHIL NFR BLD AUTO: 1.4 % (ref 0.3–6.2)
ERYTHROCYTE [DISTWIDTH] IN BLOOD BY AUTOMATED COUNT: 13.2 % (ref 12.3–15.4)
GLOBULIN UR ELPH-MCNC: 2.6 GM/DL
GLUCOSE SERPL-MCNC: 103 MG/DL (ref 65–99)
HBA1C MFR BLD: 5.6 % (ref 4.8–5.6)
HCT VFR BLD AUTO: 44.8 % (ref 37.5–51)
HDLC SERPL-MCNC: 27 MG/DL (ref 40–60)
HGB BLD-MCNC: 14.6 G/DL (ref 13–17.7)
IMM GRANULOCYTES # BLD AUTO: 0.05 10*3/MM3 (ref 0–0.05)
IMM GRANULOCYTES NFR BLD AUTO: 0.4 % (ref 0–0.5)
LDLC SERPL CALC-MCNC: 97 MG/DL (ref 0–100)
LDLC/HDLC SERPL: 3.18 {RATIO}
LYMPHOCYTES # BLD AUTO: 2.37 10*3/MM3 (ref 0.7–3.1)
LYMPHOCYTES NFR BLD AUTO: 21.3 % (ref 19.6–45.3)
MCH RBC QN AUTO: 27.5 PG (ref 26.6–33)
MCHC RBC AUTO-ENTMCNC: 32.6 G/DL (ref 31.5–35.7)
MCV RBC AUTO: 84.4 FL (ref 79–97)
MONOCYTES # BLD AUTO: 0.84 10*3/MM3 (ref 0.1–0.9)
MONOCYTES NFR BLD AUTO: 7.5 % (ref 5–12)
NEUTROPHILS NFR BLD AUTO: 68.9 % (ref 42.7–76)
NEUTROPHILS NFR BLD AUTO: 7.67 10*3/MM3 (ref 1.7–7)
NRBC BLD AUTO-RTO: 0 /100 WBC (ref 0–0.2)
PLATELET # BLD AUTO: 325 10*3/MM3 (ref 140–450)
PMV BLD AUTO: 10.8 FL (ref 6–12)
POTASSIUM SERPL-SCNC: 3.9 MMOL/L (ref 3.5–5.2)
PROT SERPL-MCNC: 7 G/DL (ref 6–8.5)
RBC # BLD AUTO: 5.31 10*6/MM3 (ref 4.14–5.8)
SODIUM SERPL-SCNC: 138 MMOL/L (ref 136–145)
TRIGL SERPL-MCNC: 341 MG/DL (ref 0–150)
VLDLC SERPL-MCNC: 57 MG/DL (ref 5–40)
WBC NRBC COR # BLD AUTO: 11.15 10*3/MM3 (ref 3.4–10.8)

## 2024-03-14 PROCEDURE — 80053 COMPREHEN METABOLIC PANEL: CPT

## 2024-03-14 PROCEDURE — 36415 COLL VENOUS BLD VENIPUNCTURE: CPT

## 2024-03-14 PROCEDURE — 85025 COMPLETE CBC W/AUTO DIFF WBC: CPT

## 2024-03-14 PROCEDURE — 83036 HEMOGLOBIN GLYCOSYLATED A1C: CPT

## 2024-03-14 PROCEDURE — 80061 LIPID PANEL: CPT

## 2024-06-10 RX ORDER — OMEPRAZOLE 40 MG/1
40 CAPSULE, DELAYED RELEASE ORAL DAILY
Qty: 60 CAPSULE | Refills: 1 | Status: SHIPPED | OUTPATIENT
Start: 2024-06-10

## 2024-06-24 RX ORDER — CETIRIZINE HYDROCHLORIDE 10 MG/1
10 TABLET ORAL DAILY
Qty: 90 TABLET | Refills: 0 | Status: SHIPPED | OUTPATIENT
Start: 2024-06-24

## 2024-08-22 ENCOUNTER — OFFICE VISIT (OUTPATIENT)
Dept: FAMILY MEDICINE CLINIC | Age: 24
End: 2024-08-22
Payer: MEDICARE

## 2024-08-22 VITALS
TEMPERATURE: 98.3 F | HEART RATE: 103 BPM | WEIGHT: 268.2 LBS | HEIGHT: 64 IN | SYSTOLIC BLOOD PRESSURE: 113 MMHG | DIASTOLIC BLOOD PRESSURE: 69 MMHG | BODY MASS INDEX: 45.79 KG/M2

## 2024-08-22 DIAGNOSIS — N50.3 EPIDIDYMAL CYST: ICD-10-CM

## 2024-08-22 DIAGNOSIS — K21.9 GERD WITHOUT ESOPHAGITIS: ICD-10-CM

## 2024-08-22 DIAGNOSIS — F84.0 AUTISM: ICD-10-CM

## 2024-08-22 DIAGNOSIS — E78.00 ELEVATED CHOLESTEROL: ICD-10-CM

## 2024-08-22 DIAGNOSIS — Z00.00 WELCOME TO MEDICARE PREVENTIVE VISIT: Primary | ICD-10-CM

## 2024-08-22 NOTE — PROGRESS NOTES
Subjective   The ABCs of the Annual Wellness Visit  Medicare Wellness Visit      Aldo Joel is a 24 y.o. patient who presents for a Medicare Wellness Visit.    The following portions of the patient's history were reviewed and   updated as appropriate: allergies, current medications, past family history, past medical history, past social history, past surgical history, and problem list.    Compared to one year ago, the patient's physical   health is better.  Compared to one year ago, the patient's mental   health is the same.    Recent Hospitalizations:  He was not admitted to the hospital during the last year.     Current Medical Providers:  Patient Care Team:  Wes Clarke MD as PCP - General (Family Medicine)    Outpatient Medications Prior to Visit   Medication Sig Dispense Refill    ARIPiprazole (ABILIFY) 2 MG tablet Take 1 tablet by mouth Daily.      cetirizine (zyrTEC) 10 MG tablet Take 1 tablet by mouth Daily. 90 tablet 0    ibuprofen (ADVIL,MOTRIN) 100 MG/5ML suspension TAKE 20 ML BY MOUTH EVERY 6 HOURS AS NEEDED FOR MILD PAIN / FEVER 240 mL 3    omeprazole (priLOSEC) 40 MG capsule TAKE 1 CAPSULE BY MOUTH DAILY 60 capsule 1     No facility-administered medications prior to visit.     No opioid medication identified on active medication list. I have reviewed chart for other potential  high risk medication/s and harmful drug interactions in the elderly.      Aspirin is not on active medication list.  Aspirin use is not indicated based on review of current medical condition/s. Risk of harm outweighs potential benefits.  .    Patient Active Problem List   Diagnosis    Other allergic rhinitis    Autism (Psychiatry)    Elevated cholesterol    GERD without esophagitis    Epididymal cyst (left, six mm per U/S, negative Urology eval)    Welcome to Medicare preventive visit     Advance Care Planning Advance Directive is not on file.  ACP discussion was held with the patient during this visit.  "Patient does not have an advance directive, information provided.            Objective   Vitals:    24 1054   BP: 113/69   BP Location: Left arm   Patient Position: Sitting   Pulse: 103   Temp: 98.3 °F (36.8 °C)   TempSrc: Oral   Weight: 122 kg (268 lb 3.2 oz)   Height: 162.6 cm (64\")   PainSc: 0-No pain       Estimated body mass index is 46.04 kg/m² as calculated from the following:    Height as of this encounter: 162.6 cm (64\").    Weight as of this encounter: 122 kg (268 lb 3.2 oz).    Class 3 Severe Obesity (BMI >=40). Obesity-related health conditions include the following: dyslipidemias. Obesity is unchanged. BMI is is above average; BMI management plan is completed. We discussed portion control and increasing exercise.      Gait and Balance Evaluation:  Normal  Does the patient have evidence of cognitive impairment? No                                                                                                Health  Risk Assessment    Smoking Status:  Social History     Tobacco Use   Smoking Status Never    Passive exposure: Never   Smokeless Tobacco Never     Alcohol Consumption:  Social History     Substance and Sexual Activity   Alcohol Use Never       Fall Risk Screen  STEADI Fall Risk Assessment was completed, and patient is at LOW risk for falls.Assessment completed on:2024    Depression Screenin/22/2024    10:52 AM   PHQ-2/PHQ-9 Depression Screening   Little Interest or Pleasure in Doing Things 0-->not at all   Feeling Down, Depressed or Hopeless 0-->not at all   PHQ-9: Brief Depression Severity Measure Score 0     Health Habits and Functional and Cognitive Screenin/22/2024    10:53 AM   Functional & Cognitive Status   Do you have difficulty preparing food and eating? No   Do you have difficulty bathing yourself, getting dressed or grooming yourself? No   Do you have difficulty using the toilet? No   Do you have difficulty moving around from place to place? No   Do you " have trouble with steps or getting out of a bed or a chair? No   Current Diet Well Balanced Diet   Dental Exam Up to date   Eye Exam Up to date   Exercise (times per week) 7 times per week   Current Exercises Include Walking   Do you need help using the phone?  No   Are you deaf or do you have serious difficulty hearing?  No   Do you need help to go to places out of walking distance? No   Do you need help shopping? No   Do you need help preparing meals?  No   Do you need help with housework?  No   Do you need help with laundry? No   Do you need help taking your medications? No   Do you need help managing money? No   Do you ever drive or ride in a car without wearing a seat belt? No   Have you felt unusual stress, anger or loneliness in the last month? No   Who do you live with? Other   If you need help, do you have trouble finding someone available to you? No   Have you been bothered in the last four weeks by sexual problems? No   Do you have difficulty concentrating, remembering or making decisions? No   Visual Acuity:  Vision Screening    Right eye Left eye Both eyes   Without correction 20/20 20/20 20/20   With correction        Age-appropriate Screening Schedule:  Refer to the list below for future screening recommendations based on patient's age, sex and/or medical conditions. Orders for these recommended tests are listed in the plan section. The patient has been provided with a written plan.    Health Maintenance List  Health Maintenance   Topic Date Due    HEPATITIS C SCREENING  Never done    BMI FOLLOWUP  08/01/2023    COVID-19 Vaccine (1 - 2023-24 season) Never done    INFLUENZA VACCINE  08/01/2024    LIPID PANEL  03/14/2025    ANNUAL WELLNESS VISIT  08/22/2025    TDAP/TD VACCINES (3 - Td or Tdap) 07/29/2031    Pneumococcal Vaccine 0-64  Completed                                                                                                                                                CMS Preventative  "Services Quick Reference  Risk Factors Identified During Encounter  None Identified    The above risks/problems have been discussed with the patient.  Pertinent information has been shared with the patient in the After Visit Summary.  An After Visit Summary and PPPS were made available to the patient.    Follow Up:   Next Medicare Wellness visit to be scheduled in 1 year.         Additional E&M Note during same encounter follows:  Patient has additional, significant, and separately identifiable condition(s)/problem(s) that require work     above and beyond the Medicare Wellness Visit     Chief Complaint  Welcome To Medicare    Subjective   --LAST LIPIDS WERE OK WITH DIETARY MEASURES ALONE  --GERD IS WELL CONTROLLED WITH THE PPI  --AUTISM IS STABLE WITH THE ABLIFY, FOLLOWED BY PSYCHIATRY  --WAS FOUND TO HAVE A SIX MM LEFT EPIDIDYMAL CYST PER U/S.  UROLOGY FELT NO FURTHER TREATMENT NECESSARY           Review of Systems   Constitutional:  Negative for activity change, appetite change, chills, fatigue and fever.   HENT:  Negative for congestion, ear pain, hearing loss, rhinorrhea and sore throat.    Eyes:  Negative for discharge and visual disturbance.   Respiratory:  Negative for cough and shortness of breath.    Cardiovascular:  Negative for chest pain, palpitations and leg swelling.   Gastrointestinal:  Negative for abdominal pain, nausea and vomiting.   Genitourinary:  Negative for dysuria and hematuria.   Musculoskeletal:  Negative for arthralgias and myalgias.   Psychiatric/Behavioral:  Negative for dysphoric mood.               Objective   Vital Signs:  /69 (BP Location: Left arm, Patient Position: Sitting)   Pulse 103   Temp 98.3 °F (36.8 °C) (Oral)   Ht 162.6 cm (64\")   Wt 122 kg (268 lb 3.2 oz)   BMI 46.04 kg/m²   Physical Exam  Vitals and nursing note reviewed.   Constitutional:       General: He is not in acute distress.     Appearance: Normal appearance.   HENT:      Right Ear: Tympanic membrane " normal.      Left Ear: Tympanic membrane normal.      Mouth/Throat:      Pharynx: Oropharynx is clear.   Eyes:      Conjunctiva/sclera: Conjunctivae normal.   Cardiovascular:      Rate and Rhythm: Normal rate and regular rhythm.      Heart sounds: Normal heart sounds. No murmur heard.  Pulmonary:      Effort: Pulmonary effort is normal.      Breath sounds: Normal breath sounds.   Abdominal:      General: Bowel sounds are normal.      Palpations: Abdomen is soft.      Tenderness: There is no abdominal tenderness.   Musculoskeletal:      Cervical back: Neck supple.      Right lower leg: No edema.      Left lower leg: No edema.   Lymphadenopathy:      Cervical: No cervical adenopathy.   Neurological:      General: No focal deficit present.      Mental Status: He is alert.      Cranial Nerves: No cranial nerve deficit.      Coordination: Coordination normal.      Gait: Gait normal.   Psychiatric:         Mood and Affect: Mood normal.         Behavior: Behavior normal.                 Assessment and Plan               Autism  STABLE CURRENTLY, PLANS PER PSYCHIATRY   Elevated cholesterol   Lipid abnormalities are stable    Plan:  Continue same medication/s without change.      Discussed medication dosage, use, side effects, and goals of treatment in detail.    Counseled patient on lifestyle modifications to help control hyperlipidemia.     Patient Treatment Goals:   LDL goal is less than 70    Followup in 6 months.  GERD without esophagitis  IMPROVED WITH CURRENT TREATMENT, CONTINUE SAME, WILL REEVALUATE AT NEXT VISIT   Epididymal cyst (left, six mm per U/S, negative Urology eval)  OBSERVE FOR NOW, U/S REPORTS AND UROLOGY NOTEW REVIEWED.    Welcome to Medicare preventive visit  ADVICE GIVEN RE:  SEATBELT USE, ALCOHOL USE, HEALTHY DIET, ROUTINE EYE AND DENTAL EXAM, ROUTINE VACCINATIONS.    No orders of the defined types were placed in this encounter.            Follow Up   Return in about 1 year (around  8/22/2025).  Patient was given instructions and counseling regarding his condition or for health maintenance advice. Please see specific information pulled into the AVS if appropriate.

## 2024-08-22 NOTE — ASSESSMENT & PLAN NOTE
ADVICE GIVEN RE:  SEATBELT USE, ALCOHOL USE, HEALTHY DIET, ROUTINE EYE AND DENTAL EXAM, ROUTINE VACCINATIONS.     no

## 2024-10-07 RX ORDER — OMEPRAZOLE 40 MG/1
40 CAPSULE, DELAYED RELEASE ORAL DAILY
Qty: 90 CAPSULE | Refills: 1 | Status: SHIPPED | OUTPATIENT
Start: 2024-10-07

## 2025-03-24 ENCOUNTER — TRANSCRIBE ORDERS (OUTPATIENT)
Dept: ADMINISTRATIVE | Facility: HOSPITAL | Age: 25
End: 2025-03-24
Payer: MEDICARE

## 2025-03-24 ENCOUNTER — LAB (OUTPATIENT)
Facility: HOSPITAL | Age: 25
End: 2025-03-24
Payer: MEDICARE

## 2025-03-24 DIAGNOSIS — Z79.899 ENCOUNTER FOR LONG-TERM (CURRENT) DRUG USE: Primary | ICD-10-CM

## 2025-03-24 DIAGNOSIS — Z79.899 ENCOUNTER FOR LONG-TERM (CURRENT) DRUG USE: ICD-10-CM

## 2025-03-24 LAB
ALBUMIN SERPL-MCNC: 4 G/DL (ref 3.5–5.2)
ALBUMIN/GLOB SERPL: 1.3 G/DL
ALP SERPL-CCNC: 88 U/L (ref 39–117)
ALT SERPL W P-5'-P-CCNC: 27 U/L (ref 1–41)
ANION GAP SERPL CALCULATED.3IONS-SCNC: 10 MMOL/L (ref 5–15)
AST SERPL-CCNC: 18 U/L (ref 1–40)
BASOPHILS # BLD AUTO: 0.05 10*3/MM3 (ref 0–0.2)
BASOPHILS NFR BLD AUTO: 0.5 % (ref 0–1.5)
BILIRUB SERPL-MCNC: 0.4 MG/DL (ref 0–1.2)
BUN SERPL-MCNC: 14 MG/DL (ref 6–20)
BUN/CREAT SERPL: 15.4 (ref 7–25)
CALCIUM SPEC-SCNC: 9.4 MG/DL (ref 8.6–10.5)
CHLORIDE SERPL-SCNC: 103 MMOL/L (ref 98–107)
CHOLEST SERPL-MCNC: 179 MG/DL (ref 0–200)
CO2 SERPL-SCNC: 24 MMOL/L (ref 22–29)
CREAT SERPL-MCNC: 0.91 MG/DL (ref 0.76–1.27)
DEPRECATED RDW RBC AUTO: 42.4 FL (ref 37–54)
EGFRCR SERPLBLD CKD-EPI 2021: 121.5 ML/MIN/1.73
EOSINOPHIL # BLD AUTO: 0.25 10*3/MM3 (ref 0–0.4)
EOSINOPHIL NFR BLD AUTO: 2.6 % (ref 0.3–6.2)
ERYTHROCYTE [DISTWIDTH] IN BLOOD BY AUTOMATED COUNT: 13.4 % (ref 12.3–15.4)
GLOBULIN UR ELPH-MCNC: 3.1 GM/DL
GLUCOSE SERPL-MCNC: 83 MG/DL (ref 65–99)
HBA1C MFR BLD: 5.8 % (ref 4.8–5.6)
HCT VFR BLD AUTO: 47.3 % (ref 37.5–51)
HDLC SERPL-MCNC: 28 MG/DL (ref 40–60)
HGB BLD-MCNC: 14.9 G/DL (ref 13–17.7)
IMM GRANULOCYTES # BLD AUTO: 0.05 10*3/MM3 (ref 0–0.05)
IMM GRANULOCYTES NFR BLD AUTO: 0.5 % (ref 0–0.5)
LDLC SERPL CALC-MCNC: 108 MG/DL (ref 0–100)
LDLC/HDLC SERPL: 3.63 {RATIO}
LYMPHOCYTES # BLD AUTO: 2.66 10*3/MM3 (ref 0.7–3.1)
LYMPHOCYTES NFR BLD AUTO: 27.9 % (ref 19.6–45.3)
MCH RBC QN AUTO: 27.4 PG (ref 26.6–33)
MCHC RBC AUTO-ENTMCNC: 31.5 G/DL (ref 31.5–35.7)
MCV RBC AUTO: 87.1 FL (ref 79–97)
MONOCYTES # BLD AUTO: 0.64 10*3/MM3 (ref 0.1–0.9)
MONOCYTES NFR BLD AUTO: 6.7 % (ref 5–12)
NEUTROPHILS NFR BLD AUTO: 5.87 10*3/MM3 (ref 1.7–7)
NEUTROPHILS NFR BLD AUTO: 61.8 % (ref 42.7–76)
NRBC BLD AUTO-RTO: 0 /100 WBC (ref 0–0.2)
PLATELET # BLD AUTO: 314 10*3/MM3 (ref 140–450)
PMV BLD AUTO: 11.2 FL (ref 6–12)
POTASSIUM SERPL-SCNC: 4.2 MMOL/L (ref 3.5–5.2)
PROT SERPL-MCNC: 7.1 G/DL (ref 6–8.5)
RBC # BLD AUTO: 5.43 10*6/MM3 (ref 4.14–5.8)
SODIUM SERPL-SCNC: 137 MMOL/L (ref 136–145)
TRIGL SERPL-MCNC: 247 MG/DL (ref 0–150)
VLDLC SERPL-MCNC: 43 MG/DL (ref 5–40)
WBC NRBC COR # BLD AUTO: 9.52 10*3/MM3 (ref 3.4–10.8)

## 2025-03-24 PROCEDURE — 80061 LIPID PANEL: CPT

## 2025-03-24 PROCEDURE — 83036 HEMOGLOBIN GLYCOSYLATED A1C: CPT

## 2025-03-24 PROCEDURE — 80053 COMPREHEN METABOLIC PANEL: CPT

## 2025-03-24 PROCEDURE — 85025 COMPLETE CBC W/AUTO DIFF WBC: CPT

## 2025-03-24 PROCEDURE — 36415 COLL VENOUS BLD VENIPUNCTURE: CPT

## 2025-04-07 RX ORDER — OMEPRAZOLE 40 MG/1
40 CAPSULE, DELAYED RELEASE ORAL DAILY
Qty: 90 CAPSULE | Refills: 1 | Status: SHIPPED | OUTPATIENT
Start: 2025-04-07

## 2025-04-29 ENCOUNTER — OFFICE VISIT (OUTPATIENT)
Dept: FAMILY MEDICINE CLINIC | Age: 25
End: 2025-04-29
Payer: MEDICARE

## 2025-04-29 VITALS
HEART RATE: 110 BPM | HEIGHT: 64 IN | SYSTOLIC BLOOD PRESSURE: 117 MMHG | BODY MASS INDEX: 46.88 KG/M2 | DIASTOLIC BLOOD PRESSURE: 78 MMHG | WEIGHT: 274.6 LBS | TEMPERATURE: 98.1 F | OXYGEN SATURATION: 95 %

## 2025-04-29 DIAGNOSIS — R21 RASH: Primary | ICD-10-CM

## 2025-04-29 PROCEDURE — 1126F AMNT PAIN NOTED NONE PRSNT: CPT

## 2025-04-29 PROCEDURE — 1160F RVW MEDS BY RX/DR IN RCRD: CPT

## 2025-04-29 PROCEDURE — 99213 OFFICE O/P EST LOW 20 MIN: CPT

## 2025-04-29 PROCEDURE — 1159F MED LIST DOCD IN RCRD: CPT

## 2025-04-29 RX ORDER — TRIAMCINOLONE ACETONIDE 1 MG/G
1 CREAM TOPICAL 2 TIMES DAILY
Qty: 30 G | Refills: 0 | Status: SHIPPED | OUTPATIENT
Start: 2025-04-29

## 2025-04-29 NOTE — PROGRESS NOTES
Subjective     CHIEF COMPLAINT    Chief Complaint   Patient presents with    Hand Pain     Swollen, itchy, and burning. Bilateral, x 3 days.      Patient or patient representative verbalized consent for the use of Ambient Listening during the visit with  JENNA Boateng for chart documentation. 4/30/2025  14:07 EDT  History of Present Illness  The patient is a 24-year-old male who presents for evaluation of a rash on his hands.    He has been experiencing a persistent rash on his hands since Saturday, characterized by swelling, burning, and itching sensations. The rash is not as severe as it was initially. No similar rashes are reported elsewhere on his body, and there is no respiratory distress or generalized swelling. No known insect bites are reported. The affected area has been scratched. He has recently been at a house his uncle is remodeling. Self-treatment with Benadryl and apple cider vinegar has been attempted, but no topical creams have been applied. Two doses of Benadryl were taken at 9:00 AM today      Review of Systems   Constitutional:  Negative for chills and fever.   HENT:  Negative for trouble swallowing.    Respiratory:  Negative for shortness of breath and wheezing.    Cardiovascular:  Negative for chest pain.   Skin:  Positive for rash.     Past Medical History:   Diagnosis Date    Allergic     Depression     Seasonal allergies          Past Surgical History:   Procedure Laterality Date    WISDOM TOOTH EXTRACTION           Family History   Problem Relation Age of Onset    Hypertension Mother     Hypertension Father     Cancer Maternal Grandfather     Cancer Paternal Grandmother         Breast cancer         Social History     Socioeconomic History    Marital status: Single   Tobacco Use    Smoking status: Never     Passive exposure: Never    Smokeless tobacco: Never   Vaping Use    Vaping status: Never Used   Substance and Sexual Activity    Alcohol use: Never    Drug use: Never    Sexual  "activity: Never         No Known Allergies       Current Outpatient Medications on File Prior to Visit   Medication Sig Dispense Refill    ARIPiprazole (ABILIFY) 2 MG tablet Take 1 tablet by mouth Daily.      cetirizine (zyrTEC) 10 MG tablet Take 1 tablet by mouth Daily. 90 tablet 0    ibuprofen (ADVIL,MOTRIN) 100 MG/5ML suspension TAKE 20 ML BY MOUTH EVERY 6 HOURS AS NEEDED FOR MILD PAIN / FEVER 240 mL 3    omeprazole (priLOSEC) 40 MG capsule TAKE 1 CAPSULE BY MOUTH DAILY 90 capsule 1     No current facility-administered medications on file prior to visit.     /78   Pulse 110   Temp 98.1 °F (36.7 °C) (Oral)   Ht 162.6 cm (64.02\")   Wt 125 kg (274 lb 9.6 oz)   SpO2 95% Comment: room air  BMI 47.11 kg/m²       Objective     Physical Exam  Vitals and nursing note reviewed.   Constitutional:       General: He is not in acute distress.     Appearance: Normal appearance. He is not ill-appearing.   Cardiovascular:      Pulses:           Radial pulses are 2+ on the right side and 2+ on the left side.   Pulmonary:      Effort: No accessory muscle usage or respiratory distress.   Musculoskeletal:      Cervical back: Normal range of motion.      Comments: Rash noted to bilateral hands. Small erythematous papules to fingers. Hands appear slightly swollen.    Skin:     General: Skin is warm and dry.      Capillary Refill: Capillary refill takes less than 2 seconds.   Neurological:      General: No focal deficit present.      Mental Status: He is alert and oriented to person, place, and time.   Psychiatric:         Mood and Affect: Mood and affect normal.         Behavior: Behavior normal.         Assessment & Plan  Rash  Rash appears consistent with some type of contact dermatitis.  Treatment with Kenalog cream.  Also recommend use of Aquaphor for moisture, can also use ice pack to help with discomfort.  Return to clinic if symptoms worsen or fail to improve.    Orders:    triamcinolone (KENALOG) 0.1 % cream; Apply " 1 Application topically to the appropriate area as directed 2 (Two) Times a Day.      Follow up:  Return if symptoms worsen or fail to improve.  Patient was given instructions and counseling regarding his condition or for health maintenance advice. Please see specific information pulled into the AVS if appropriate.

## 2025-08-22 ENCOUNTER — OFFICE VISIT (OUTPATIENT)
Dept: FAMILY MEDICINE CLINIC | Age: 25
End: 2025-08-22
Payer: MEDICARE

## 2025-08-22 VITALS
DIASTOLIC BLOOD PRESSURE: 73 MMHG | BODY MASS INDEX: 47.77 KG/M2 | HEIGHT: 64 IN | TEMPERATURE: 98.2 F | HEART RATE: 89 BPM | OXYGEN SATURATION: 96 % | SYSTOLIC BLOOD PRESSURE: 115 MMHG | WEIGHT: 279.8 LBS

## 2025-08-22 DIAGNOSIS — E78.00 ELEVATED CHOLESTEROL: ICD-10-CM

## 2025-08-22 DIAGNOSIS — K21.9 GERD WITHOUT ESOPHAGITIS: ICD-10-CM

## 2025-08-22 DIAGNOSIS — Z00.00 MEDICARE ANNUAL WELLNESS VISIT, SUBSEQUENT: Primary | ICD-10-CM

## 2025-08-22 DIAGNOSIS — R73.03 PREDIABETES: ICD-10-CM

## 2025-08-22 RX ORDER — IBUPROFEN 100 MG/5ML
400 SUSPENSION ORAL EVERY 6 HOURS PRN
Qty: 240 ML | Refills: 3 | Status: SHIPPED | OUTPATIENT
Start: 2025-08-22